# Patient Record
Sex: FEMALE | Race: WHITE
[De-identification: names, ages, dates, MRNs, and addresses within clinical notes are randomized per-mention and may not be internally consistent; named-entity substitution may affect disease eponyms.]

---

## 2018-07-16 NOTE — HP
[facility] MD Rehab Assess/Revision





- Admission History


Ad[facility] to Rehab from: [facility]


Date of Admission to Rehab: 07/16/18





- Vital signs


Vital Signs: 


 Vital Signs











 Period  Temp  Pulse  Resp  BP Sys/Storey  Pulse Ox


 


 Last 24 Hr  97.7 F  80  18  112/73  














- Findings


Detox History & Physical reviewed: Yes


Concur with findings: Yes


Comments/Additional Findings: for rehab as protocol





Inpatient Rehab Admission





- Initial Determination


Are CD services needed?: Yes


Free of communicable disease: Yes


Not in need of hospitalization: Yes





- Rehab Admission Criteria


Previous failed treatment: Yes


Poor recovery environment: Yes


Comorbidities: Yes


Lacks judgement: No


Patient is meeting Inpatient Rehab admission criteria:: Yes

## 2018-07-16 NOTE — PN
[facility] Progress Note


Note: 





 Vital Signs











Temperature  97.7 F   07/16/18 12:10


 


Pulse Rate  80   07/16/18 12:10


 


Respiratory Rate  18   07/16/18 12:10


 


Blood Pressure  112/73   07/16/18 12:10


 


O2 Sat by Pulse Oximetry (%)      








keppra levels 7/13/18 at 8.4, asymptomatic. 


Repeat levels in AM.
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
## 2018-07-20 NOTE — PN
[facility] Progress Note


Note: 





Repeat Keppra level 8.3. Patient asymptomatic. Will continue Keppra as ordered 

and repeat level 7/23/18.

## 2018-08-10 NOTE — PN
[facility] Progress Note


Note: 


Patient reported to RN that she had an aura yesterday. Has history of seizures 

and last seizure one year ago. Currently on Keppra and lab test for level 

pending. Patient is medically stable. Denies any symptoms today. Alert and 

oriented x 3. In NAD. Ambulating in unit freely. Will continue same dose of 

keppra. Continue to monitor clinically. 


 Vital Signs











Temperature  98.0 F   08/10/18 07:23


 


Pulse Rate  79   08/10/18 09:22


 


Respiratory Rate  18   08/10/18 07:23


 


Blood Pressure  119/80   08/10/18 09:22


 


O2 Sat by Pulse Oximetry (%)

## 2018-08-13 NOTE — PN
Psychiatric Progress Note


Vital Signs: 


 Vital Signs











 Period  Temp  Pulse  Resp  BP Sys/Storey  Pulse Ox


 


 Last 24 Hr  97.8 F  73-91  18-18  112-128/77-85  











Date of Session: 08/13/18


Chief Complaint:: Discharge visit


HPI: Opioid dependence ,Alcohol and Sedative dependence comorbid with PTSD,

Bipolar disorder.


ROS: Significant for


Current Medications: 


Active Medications











Generic Name Dose Route Start Last Admin





  Trade Name Freq  PRN Reason Stop Dose Admin


 


Acetaminophen  650 mg  07/16/18 13:12  07/26/18 18:53





  Tylenol -  PO   650 mg





  Q4H PRN   Administration





  FEVER   





     





     





     


 


Al Hydroxide/Mg Hydroxide  30 ml  07/16/18 13:12  





  Mylanta Oral Suspension -  PO   





  Q6H PRN   





  DYSPEPSIA   





     





     





     


 


Docusate Sodium  100 mg  08/06/18 22:00  08/13/18 06:30





  Colace -  PO   100 mg





  TID ALEKSANDRA   Administration





     





     





     





     


 


Escitalopram Oxalate  20 mg  07/16/18 17:00  08/12/18 10:04





  Lexapro -  PO   20 mg





  DAILY ALEKSANDRA   Administration





     





     





     





     


 


Eucalyptus/Menthol/Phenol/Sorbitol  1 each  07/16/18 13:12  





  Cepastat Lozenge -  MM   





  Q4H PRN   





  SORE THROAT   





     





     





     


 


Furosemide  20 mg  07/17/18 10:00  08/12/18 10:04





  Lasix -  PO   20 mg





  DAILY ALEKSANDRA   Administration





     





     





     





     


 


Guaifenesin  10 ml  07/16/18 13:12  





  Robitussin Dm -  PO   





  Q6H PRN   





  COUGH   





     





     





     


 


Hydroxyzine Pamoate  50 mg  07/16/18 13:12  08/12/18 21:29





  Vistaril -  PO   50 mg





  Q4H PRN   Administration





  AGITATION   





     





     





     


 


Ibuprofen  400 mg  07/16/18 13:12  08/04/18 21:54





  Motrin -  PO   400 mg





  Q6H PRN   Administration





  Pain Level 4-6   





     





     





     


 


Levetiracetam  500 mg  07/16/18 22:00  08/12/18 21:29





  Keppra -  PO   500 mg





  BID ALEKSANDRA   Administration





     





     





     





     


 


Loperamide HCl  4 mg  07/16/18 13:12  





  Imodium -  PO   





  Q6H PRN   





  DIARRHEA   





     





     





     


 


Magnesium Citrate  300 ml  07/16/18 13:12  





  Citroma -  PO   





  Q48H PRN   





  CONSTIPATION   





     





     





     


 


Magnesium Hydroxide  30 ml  07/16/18 13:12  





  Milk Of Magnesia -  PO   





  DAILY PRN   





  CONSTIPATION   





     





     





     


 


Melatonin  5 mg  07/16/18 22:00  07/21/18 22:10





  Melatonin  PO   5 mg





  HS PRN   Administration





  INSOMNIA   





     





     





     


 


Methadone HCl 80 mg/ Methadone  95 mg  08/13/18 06:00  08/13/18 06:31





  HCl 10 mg/ Methadone HCl 5 mg  PO  08/20/18 05:59  95 mg





  DAILY@0600 ALEKSANDRA   Administration





     





     





     





     


 


Prenatal Multivit/Folic Acid/Iron  1 tab  07/17/18 10:00  08/12/18 10:04





  Prenatal Vitamins (Sjr) -  PO   1 tab





  DAILY ALEKSANDRA   Administration





     





     





     





     


 


Pseudoephedrine/Triprolidine  1 combo  07/16/18 13:12  





  Actifed -  PO   





  TID PRN   





  NASAL CONGESTION   





     





     





     


 


Ranitidine HCl  150 mg  07/17/18 10:00  08/12/18 10:04





  Zantac -  PO   150 mg





  DAILY ALEKSANDRA   Administration





     





     





     





     


 


Thiamine HCl  100 mg  07/16/18 22:00  08/12/18 21:29





  Vitamin B1 -  PO   100 mg





  HS ALEKSANDRA   Administration





     





     





     





     











Current Side Effect: No


Lab tests ordered: No


Lab tests reviewed: Yes


Provider note:: Patient completed this program today .She has met her treatment 

goals and will continue to address her issues on outpatient basis.Patient 

continue to find that Lexapro 20 mg po daily help to cope with depressed mood ,

anxiety.Script for 30 days provided.  THrapy provided focusing on relapse 

prevention,coping skills,support utilization as well as other resourses of 

recovery maintanance has been provided.  Patient is stable for discharge today.


Total face to face time:: 25





Mental Status Exam





- Mental Status Exam


Alert and Oriented to: Time, Place, Person


Cognitive Function: Grossly Intact


Patient Appearance: Well Groomed


Mood: Hopeful, Euthymic


Affect: Appropriate, Mood Congruent


Patient Behavior: Cooperative


Voice Loudness: Normal


Thought Process: Goal Oriented


Thought Disorder: Not Present


Hallucinations: Denies


Suicidal Ideation: Denies


Homicidal Ideation: Denies


Insight/Judgement: Fair


Sleep: Fair


Appetite: Fair


Muscle strength/Tone: Normal


Gait/Station: Normal





Psychiatric Treatment Plan





- Problem List


(1) Sedative dependence


Current Visit: Yes   





(2) Nicotine dependence


Current Visit: Yes   


Qualifiers: 


   Nicotine product type: cigarettes 





(3) Opioid dependence on agonist therapy


Current Visit: Yes   





(4) Seizure disorder


Current Visit: Yes   





(5) Bipolar disorder


Current Visit: Yes   





(6) PTSD (post-traumatic stress disorder)


Current Visit: Yes   





(7) Alcohol dependence


Current Visit: No

## 2020-11-18 ENCOUNTER — HOSPITAL ENCOUNTER (INPATIENT)
Dept: HOSPITAL 74 - YASAS | Age: 35
LOS: 4 days | Discharge: LEFT BEFORE DISCHARGE | DRG: 770 | End: 2020-11-22
Attending: ALLERGY & IMMUNOLOGY | Admitting: ALLERGY & IMMUNOLOGY
Payer: COMMERCIAL

## 2020-11-18 VITALS — BODY MASS INDEX: 35.7 KG/M2

## 2020-11-18 DIAGNOSIS — F60.3: ICD-10-CM

## 2020-11-18 DIAGNOSIS — R73.03: ICD-10-CM

## 2020-11-18 DIAGNOSIS — R03.0: ICD-10-CM

## 2020-11-18 DIAGNOSIS — Z62.810: ICD-10-CM

## 2020-11-18 DIAGNOSIS — M54.5: ICD-10-CM

## 2020-11-18 DIAGNOSIS — G40.909: ICD-10-CM

## 2020-11-18 DIAGNOSIS — F31.9: ICD-10-CM

## 2020-11-18 DIAGNOSIS — D72.829: ICD-10-CM

## 2020-11-18 DIAGNOSIS — F17.210: ICD-10-CM

## 2020-11-18 DIAGNOSIS — K21.9: ICD-10-CM

## 2020-11-18 DIAGNOSIS — Z91.5: ICD-10-CM

## 2020-11-18 DIAGNOSIS — F90.9: ICD-10-CM

## 2020-11-18 DIAGNOSIS — F14.20: ICD-10-CM

## 2020-11-18 DIAGNOSIS — R35.0: ICD-10-CM

## 2020-11-18 DIAGNOSIS — F63.3: ICD-10-CM

## 2020-11-18 DIAGNOSIS — F10.230: Primary | ICD-10-CM

## 2020-11-18 DIAGNOSIS — F11.23: ICD-10-CM

## 2020-11-18 DIAGNOSIS — F43.10: ICD-10-CM

## 2020-11-18 DIAGNOSIS — Z91.410: ICD-10-CM

## 2020-11-18 DIAGNOSIS — F19.282: ICD-10-CM

## 2020-11-18 DIAGNOSIS — Z59.0: ICD-10-CM

## 2020-11-18 DIAGNOSIS — F19.24: ICD-10-CM

## 2020-11-18 DIAGNOSIS — B18.2: ICD-10-CM

## 2020-11-18 LAB
ALBUMIN SERPL-MCNC: 3.1 G/DL (ref 3.4–5)
ALP SERPL-CCNC: 110 U/L (ref 45–117)
ALT SERPL-CCNC: 19 U/L (ref 13–61)
ANION GAP SERPL CALC-SCNC: 5 MMOL/L (ref 8–16)
AST SERPL-CCNC: 12 U/L (ref 15–37)
BILIRUB SERPL-MCNC: 0.4 MG/DL (ref 0.2–1)
BUN SERPL-MCNC: 11.6 MG/DL (ref 7–18)
CALCIUM SERPL-MCNC: 8.9 MG/DL (ref 8.5–10.1)
CHLORIDE SERPL-SCNC: 105 MMOL/L (ref 98–107)
CO2 SERPL-SCNC: 30 MMOL/L (ref 21–32)
CREAT SERPL-MCNC: 1 MG/DL (ref 0.55–1.3)
DEPRECATED RDW RBC AUTO: 15.4 % (ref 11.6–15.6)
GLUCOSE SERPL-MCNC: 97 MG/DL (ref 74–106)
HCT VFR BLD CALC: 41.4 % (ref 32.4–45.2)
HGB BLD-MCNC: 13.6 GM/DL (ref 10.7–15.3)
MCH RBC QN AUTO: 28.4 PG (ref 25.7–33.7)
MCHC RBC AUTO-ENTMCNC: 33 G/DL (ref 32–36)
MCV RBC: 86.1 FL (ref 80–96)
PLATELET # BLD AUTO: 320 K/MM3 (ref 134–434)
PMV BLD: 7.6 FL (ref 7.5–11.1)
POTASSIUM SERPLBLD-SCNC: 3.9 MMOL/L (ref 3.5–5.1)
PROT SERPL-MCNC: 6.8 G/DL (ref 6.4–8.2)
RBC # BLD AUTO: 4.81 M/MM3 (ref 3.6–5.2)
SODIUM SERPL-SCNC: 140 MMOL/L (ref 136–145)
WBC # BLD AUTO: 11.5 K/MM3 (ref 4–10)

## 2020-11-18 PROCEDURE — U0003 INFECTIOUS AGENT DETECTION BY NUCLEIC ACID (DNA OR RNA); SEVERE ACUTE RESPIRATORY SYNDROME CORONAVIRUS 2 (SARS-COV-2) (CORONAVIRUS DISEASE [COVID-19]), AMPLIFIED PROBE TECHNIQUE, MAKING USE OF HIGH THROUGHPUT TECHNOLOGIES AS DESCRIBED BY CMS-2020-01-R: HCPCS

## 2020-11-18 PROCEDURE — C9803 HOPD COVID-19 SPEC COLLECT: HCPCS

## 2020-11-18 PROCEDURE — HZ2ZZZZ DETOXIFICATION SERVICES FOR SUBSTANCE ABUSE TREATMENT: ICD-10-PCS | Performed by: ALLERGY & IMMUNOLOGY

## 2020-11-18 RX ADMIN — HYDROXYZINE PAMOATE PRN MG: 25 CAPSULE ORAL at 19:20

## 2020-11-18 RX ADMIN — NICOTINE SCH: 14 PATCH, EXTENDED RELEASE TRANSDERMAL at 14:35

## 2020-11-18 RX ADMIN — Medication SCH MG: at 22:06

## 2020-11-18 RX ADMIN — HYDROXYZINE PAMOATE PRN MG: 25 CAPSULE ORAL at 14:38

## 2020-11-18 RX ADMIN — Medication SCH TAB: at 14:35

## 2020-11-18 RX ADMIN — IBUPROFEN PRN MG: 400 TABLET, FILM COATED ORAL at 19:20

## 2020-11-18 RX ADMIN — METHOCARBAMOL PRN MG: 500 TABLET ORAL at 18:11

## 2020-11-18 SDOH — ECONOMIC STABILITY - HOUSING INSECURITY: HOMELESSNESS: Z59.0

## 2020-11-19 RX ADMIN — BUPRENORPHINE HYDROCHLORIDE, NALOXONE HYDROCHLORIDE SCH EACH: 8; 2 FILM, SOLUBLE BUCCAL; SUBLINGUAL at 09:09

## 2020-11-19 RX ADMIN — HYDROXYZINE PAMOATE PRN MG: 25 CAPSULE ORAL at 09:09

## 2020-11-19 RX ADMIN — HYDROXYZINE PAMOATE PRN MG: 50 CAPSULE ORAL at 22:37

## 2020-11-19 RX ADMIN — ESCITALOPRAM SCH MG: 20 TABLET, FILM COATED ORAL at 11:58

## 2020-11-19 RX ADMIN — MIRTAZAPINE SCH MG: 15 TABLET, FILM COATED ORAL at 22:34

## 2020-11-19 RX ADMIN — NICOTINE SCH MG: 14 PATCH, EXTENDED RELEASE TRANSDERMAL at 10:22

## 2020-11-19 RX ADMIN — HYDROXYZINE PAMOATE PRN MG: 50 CAPSULE ORAL at 15:58

## 2020-11-19 RX ADMIN — HYDROXYZINE PAMOATE PRN MG: 50 CAPSULE ORAL at 11:59

## 2020-11-19 RX ADMIN — METHOCARBAMOL PRN MG: 500 TABLET ORAL at 15:55

## 2020-11-19 RX ADMIN — IBUPROFEN PRN MG: 400 TABLET, FILM COATED ORAL at 17:52

## 2020-11-19 RX ADMIN — Medication SCH TAB: at 10:21

## 2020-11-19 RX ADMIN — SUVOREXANT PRN MG: 10 TABLET, FILM COATED ORAL at 22:33

## 2020-11-19 RX ADMIN — PANTOPRAZOLE SODIUM SCH MG: 40 TABLET, DELAYED RELEASE ORAL at 10:30

## 2020-11-19 RX ADMIN — Medication SCH MG: at 22:34

## 2020-11-20 RX ADMIN — HYDROXYZINE PAMOATE PRN MG: 50 CAPSULE ORAL at 10:36

## 2020-11-20 RX ADMIN — HYDROXYZINE PAMOATE PRN MG: 50 CAPSULE ORAL at 13:55

## 2020-11-20 RX ADMIN — DOCUSATE SODIUM SCH MG: 100 CAPSULE, LIQUID FILLED ORAL at 22:08

## 2020-11-20 RX ADMIN — Medication SCH TAB: at 10:31

## 2020-11-20 RX ADMIN — LIDOCAINE SCH PATCH: 50 PATCH TOPICAL at 13:48

## 2020-11-20 RX ADMIN — HYDROXYZINE PAMOATE PRN MG: 50 CAPSULE ORAL at 22:10

## 2020-11-20 RX ADMIN — HYDROXYZINE PAMOATE PRN MG: 50 CAPSULE ORAL at 05:56

## 2020-11-20 RX ADMIN — NICOTINE SCH: 14 PATCH, EXTENDED RELEASE TRANSDERMAL at 10:31

## 2020-11-20 RX ADMIN — PANTOPRAZOLE SODIUM SCH MG: 40 TABLET, DELAYED RELEASE ORAL at 10:30

## 2020-11-20 RX ADMIN — IBUPROFEN PRN MG: 400 TABLET, FILM COATED ORAL at 17:28

## 2020-11-20 RX ADMIN — HYDROXYZINE PAMOATE PRN MG: 50 CAPSULE ORAL at 17:26

## 2020-11-20 RX ADMIN — ESCITALOPRAM SCH MG: 20 TABLET, FILM COATED ORAL at 10:30

## 2020-11-20 RX ADMIN — METHOCARBAMOL PRN MG: 500 TABLET ORAL at 05:55

## 2020-11-20 RX ADMIN — METHOCARBAMOL PRN MG: 500 TABLET ORAL at 17:26

## 2020-11-20 RX ADMIN — Medication SCH EACH: at 22:12

## 2020-11-20 RX ADMIN — HYDROCORTISONE SCH APPLIC: 1 CREAM TOPICAL at 13:53

## 2020-11-20 RX ADMIN — SUVOREXANT PRN MG: 10 TABLET, FILM COATED ORAL at 22:09

## 2020-11-20 RX ADMIN — MIRTAZAPINE SCH MG: 15 TABLET, FILM COATED ORAL at 22:08

## 2020-11-20 RX ADMIN — DOCUSATE SODIUM SCH: 100 CAPSULE, LIQUID FILLED ORAL at 15:12

## 2020-11-20 RX ADMIN — Medication SCH MG: at 22:08

## 2020-11-20 RX ADMIN — BUPRENORPHINE HYDROCHLORIDE, NALOXONE HYDROCHLORIDE SCH EACH: 8; 2 FILM, SOLUBLE BUCCAL; SUBLINGUAL at 10:31

## 2020-11-21 LAB — HIV 1+2 AB+HIV1 P24 AG SERPL QL IA: NEGATIVE

## 2020-11-21 RX ADMIN — HYDROCORTISONE SCH APPLIC: 1 CREAM TOPICAL at 10:05

## 2020-11-21 RX ADMIN — HYDROXYZINE PAMOATE PRN MG: 50 CAPSULE ORAL at 07:36

## 2020-11-21 RX ADMIN — HYDROXYZINE PAMOATE PRN MG: 50 CAPSULE ORAL at 03:27

## 2020-11-21 RX ADMIN — DOCUSATE SODIUM SCH MG: 100 CAPSULE, LIQUID FILLED ORAL at 14:08

## 2020-11-21 RX ADMIN — METHOCARBAMOL PRN MG: 500 TABLET ORAL at 06:02

## 2020-11-21 RX ADMIN — LIDOCAINE SCH PATCH: 50 PATCH TOPICAL at 10:10

## 2020-11-21 RX ADMIN — DOCUSATE SODIUM SCH MG: 100 CAPSULE, LIQUID FILLED ORAL at 05:58

## 2020-11-21 RX ADMIN — ESCITALOPRAM SCH MG: 20 TABLET, FILM COATED ORAL at 10:06

## 2020-11-21 RX ADMIN — BUPRENORPHINE HYDROCHLORIDE, NALOXONE HYDROCHLORIDE SCH EACH: 8; 2 FILM, SOLUBLE BUCCAL; SUBLINGUAL at 10:06

## 2020-11-21 RX ADMIN — Medication SCH EACH: at 22:07

## 2020-11-21 RX ADMIN — NICOTINE SCH MG: 14 PATCH, EXTENDED RELEASE TRANSDERMAL at 10:10

## 2020-11-21 RX ADMIN — DOCUSATE SODIUM SCH MG: 100 CAPSULE, LIQUID FILLED ORAL at 22:06

## 2020-11-21 RX ADMIN — HYDROXYZINE PAMOATE PRN MG: 50 CAPSULE ORAL at 13:57

## 2020-11-21 RX ADMIN — METHOCARBAMOL PRN MG: 500 TABLET ORAL at 15:50

## 2020-11-21 RX ADMIN — SUVOREXANT PRN MG: 10 TABLET, FILM COATED ORAL at 22:06

## 2020-11-21 RX ADMIN — MIRTAZAPINE SCH MG: 15 TABLET, FILM COATED ORAL at 22:06

## 2020-11-21 RX ADMIN — HYDROXYZINE PAMOATE PRN MG: 50 CAPSULE ORAL at 22:09

## 2020-11-21 RX ADMIN — Medication SCH MG: at 22:06

## 2020-11-21 RX ADMIN — Medication SCH TAB: at 10:11

## 2020-11-21 RX ADMIN — PANTOPRAZOLE SODIUM SCH MG: 40 TABLET, DELAYED RELEASE ORAL at 10:06

## 2020-11-22 VITALS — TEMPERATURE: 98 F | SYSTOLIC BLOOD PRESSURE: 146 MMHG | HEART RATE: 94 BPM | DIASTOLIC BLOOD PRESSURE: 88 MMHG

## 2020-11-22 RX ADMIN — Medication SCH TAB: at 10:30

## 2020-11-22 RX ADMIN — DOCUSATE SODIUM SCH: 100 CAPSULE, LIQUID FILLED ORAL at 14:15

## 2020-11-22 RX ADMIN — HYDROXYZINE PAMOATE PRN MG: 50 CAPSULE ORAL at 10:30

## 2020-11-22 RX ADMIN — DOCUSATE SODIUM SCH MG: 100 CAPSULE, LIQUID FILLED ORAL at 07:18

## 2020-11-22 RX ADMIN — HYDROXYZINE PAMOATE PRN MG: 50 CAPSULE ORAL at 14:14

## 2020-11-22 RX ADMIN — ESCITALOPRAM SCH MG: 20 TABLET, FILM COATED ORAL at 10:29

## 2020-11-22 RX ADMIN — NICOTINE SCH MG: 14 PATCH, EXTENDED RELEASE TRANSDERMAL at 10:30

## 2020-11-22 RX ADMIN — LIDOCAINE SCH PATCH: 50 PATCH TOPICAL at 10:30

## 2020-11-22 RX ADMIN — METHOCARBAMOL PRN MG: 500 TABLET ORAL at 07:25

## 2020-11-22 RX ADMIN — HYDROCORTISONE SCH APPLIC: 1 CREAM TOPICAL at 10:31

## 2020-11-22 RX ADMIN — PANTOPRAZOLE SODIUM SCH MG: 40 TABLET, DELAYED RELEASE ORAL at 10:31

## 2020-11-22 RX ADMIN — BUPRENORPHINE HYDROCHLORIDE, NALOXONE HYDROCHLORIDE SCH EACH: 8; 2 FILM, SOLUBLE BUCCAL; SUBLINGUAL at 10:30

## 2020-11-22 RX ADMIN — HYDROXYZINE PAMOATE PRN MG: 50 CAPSULE ORAL at 07:22

## 2021-03-30 ENCOUNTER — HOSPITAL ENCOUNTER (INPATIENT)
Dept: HOSPITAL 74 - YASAS | Age: 36
LOS: 2 days | Discharge: LEFT BEFORE DISCHARGE | DRG: 770 | End: 2021-04-01
Attending: ALLERGY & IMMUNOLOGY | Admitting: ALLERGY & IMMUNOLOGY
Payer: COMMERCIAL

## 2021-03-30 VITALS — BODY MASS INDEX: 29.9 KG/M2

## 2021-03-30 DIAGNOSIS — F11.23: Primary | ICD-10-CM

## 2021-03-30 DIAGNOSIS — F63.3: ICD-10-CM

## 2021-03-30 DIAGNOSIS — G40.909: ICD-10-CM

## 2021-03-30 DIAGNOSIS — N89.8: ICD-10-CM

## 2021-03-30 DIAGNOSIS — Z86.19: ICD-10-CM

## 2021-03-30 DIAGNOSIS — Z91.410: ICD-10-CM

## 2021-03-30 DIAGNOSIS — Z56.0: ICD-10-CM

## 2021-03-30 DIAGNOSIS — Z91.19: ICD-10-CM

## 2021-03-30 DIAGNOSIS — E11.9: ICD-10-CM

## 2021-03-30 DIAGNOSIS — F19.20: ICD-10-CM

## 2021-03-30 DIAGNOSIS — K21.9: ICD-10-CM

## 2021-03-30 DIAGNOSIS — F31.9: ICD-10-CM

## 2021-03-30 DIAGNOSIS — F19.24: ICD-10-CM

## 2021-03-30 DIAGNOSIS — Z59.0: ICD-10-CM

## 2021-03-30 DIAGNOSIS — F17.210: ICD-10-CM

## 2021-03-30 DIAGNOSIS — F14.20: ICD-10-CM

## 2021-03-30 LAB
ALBUMIN SERPL-MCNC: 3.2 G/DL (ref 3.4–5)
ALP SERPL-CCNC: 92 U/L (ref 45–117)
ALT SERPL-CCNC: 15 U/L (ref 13–61)
ANION GAP SERPL CALC-SCNC: 10 MMOL/L (ref 8–16)
APPEARANCE UR: CLEAR
AST SERPL-CCNC: 8 U/L (ref 15–37)
BACTERIA # UR AUTO: 322 /UL (ref 0–1359)
BILIRUB SERPL-MCNC: 0.2 MG/DL (ref 0.2–1)
BILIRUB UR STRIP.AUTO-MCNC: NEGATIVE MG/DL
BUN SERPL-MCNC: 10 MG/DL (ref 7–18)
CALCIUM SERPL-MCNC: 8.9 MG/DL (ref 8.5–10.1)
CASTS URNS QL MICRO: 6 /UL (ref 0–3.1)
CHLORIDE SERPL-SCNC: 105 MMOL/L (ref 98–107)
CO2 SERPL-SCNC: 27 MMOL/L (ref 21–32)
COLOR UR: YELLOW
CREAT SERPL-MCNC: 0.8 MG/DL (ref 0.55–1.3)
DEPRECATED RDW RBC AUTO: 13.3 % (ref 11.6–15.6)
EPITH CASTS URNS QL MICRO: 10 /UL (ref 0–25.1)
GLUCOSE SERPL-MCNC: 148 MG/DL (ref 74–106)
HCT VFR BLD CALC: 38 % (ref 32.4–45.2)
HGB BLD-MCNC: 12.6 GM/DL (ref 10.7–15.3)
KETONES UR QL STRIP: NEGATIVE
LEUKOCYTE ESTERASE UR QL STRIP.AUTO: (no result)
MCH RBC QN AUTO: 28.7 PG (ref 25.7–33.7)
MCHC RBC AUTO-ENTMCNC: 33.1 G/DL (ref 32–36)
MCV RBC: 87 FL (ref 80–96)
NITRITE UR QL STRIP: NEGATIVE
PH UR: 6.5 [PH] (ref 5–8)
PLATELET # BLD AUTO: 354 K/MM3 (ref 134–434)
PMV BLD: 8 FL (ref 7.5–11.1)
POTASSIUM SERPLBLD-SCNC: 3.4 MMOL/L (ref 3.5–5.1)
PROT SERPL-MCNC: 6.8 G/DL (ref 6.4–8.2)
PROT UR QL STRIP: NEGATIVE
PROT UR QL STRIP: NEGATIVE
RBC # BLD AUTO: 12 /UL (ref 0–23.9)
RBC # BLD AUTO: 4.37 M/MM3 (ref 3.6–5.2)
SODIUM SERPL-SCNC: 141 MMOL/L (ref 136–145)
SP GR UR: 1.03 (ref 1.01–1.03)
UROBILINOGEN UR STRIP-MCNC: 1 MG/DL (ref 0.2–1)
WBC # BLD AUTO: 13.7 K/MM3 (ref 4–10)
WBC # UR AUTO: 115 /UL (ref 0–25.8)

## 2021-03-30 PROCEDURE — U0003 INFECTIOUS AGENT DETECTION BY NUCLEIC ACID (DNA OR RNA); SEVERE ACUTE RESPIRATORY SYNDROME CORONAVIRUS 2 (SARS-COV-2) (CORONAVIRUS DISEASE [COVID-19]), AMPLIFIED PROBE TECHNIQUE, MAKING USE OF HIGH THROUGHPUT TECHNOLOGIES AS DESCRIBED BY CMS-2020-01-R: HCPCS

## 2021-03-30 PROCEDURE — U0005 INFEC AGEN DETEC AMPLI PROBE: HCPCS

## 2021-03-30 PROCEDURE — HZ2ZZZZ DETOXIFICATION SERVICES FOR SUBSTANCE ABUSE TREATMENT: ICD-10-PCS | Performed by: ALLERGY & IMMUNOLOGY

## 2021-03-30 PROCEDURE — C9803 HOPD COVID-19 SPEC COLLECT: HCPCS

## 2021-03-30 RX ADMIN — SUVOREXANT PRN MG: 10 TABLET, FILM COATED ORAL at 22:12

## 2021-03-30 RX ADMIN — METHOCARBAMOL PRN MG: 500 TABLET ORAL at 19:45

## 2021-03-30 RX ADMIN — NICOTINE SCH: 21 PATCH TRANSDERMAL at 12:10

## 2021-03-30 RX ADMIN — HYDROXYZINE PAMOATE SCH MG: 25 CAPSULE ORAL at 22:12

## 2021-03-30 RX ADMIN — Medication SCH: at 22:13

## 2021-03-30 RX ADMIN — HYDROXYZINE PAMOATE SCH: 25 CAPSULE ORAL at 14:28

## 2021-03-30 RX ADMIN — HYDROXYZINE PAMOATE SCH MG: 25 CAPSULE ORAL at 17:19

## 2021-03-30 RX ADMIN — Medication SCH MG: at 22:12

## 2021-03-30 SDOH — ECONOMIC STABILITY - INCOME SECURITY: UNEMPLOYMENT, UNSPECIFIED: Z56.0

## 2021-03-30 SDOH — ECONOMIC STABILITY - HOUSING INSECURITY: HOMELESSNESS: Z59.0

## 2021-03-31 RX ADMIN — NICOTINE SCH: 21 PATCH TRANSDERMAL at 09:29

## 2021-03-31 RX ADMIN — METHOCARBAMOL PRN MG: 500 TABLET ORAL at 09:30

## 2021-03-31 RX ADMIN — Medication SCH TAB: at 09:31

## 2021-03-31 RX ADMIN — HYDROXYZINE PAMOATE SCH MG: 25 CAPSULE ORAL at 09:30

## 2021-03-31 RX ADMIN — Medication SCH MG: at 21:59

## 2021-03-31 RX ADMIN — SUVOREXANT PRN MG: 10 TABLET, FILM COATED ORAL at 21:59

## 2021-03-31 RX ADMIN — HYDROXYZINE PAMOATE SCH: 25 CAPSULE ORAL at 06:14

## 2021-03-31 RX ADMIN — HYDROXYZINE PAMOATE SCH MG: 25 CAPSULE ORAL at 17:10

## 2021-03-31 RX ADMIN — HYDROXYZINE PAMOATE SCH MG: 25 CAPSULE ORAL at 21:59

## 2021-03-31 RX ADMIN — POTASSIUM CHLORIDE SCH MEQ: 20 SOLUTION ORAL at 21:59

## 2021-03-31 RX ADMIN — HYDROXYZINE PAMOATE SCH MG: 25 CAPSULE ORAL at 13:13

## 2021-04-01 VITALS — SYSTOLIC BLOOD PRESSURE: 123 MMHG | TEMPERATURE: 96.9 F | HEART RATE: 84 BPM | DIASTOLIC BLOOD PRESSURE: 85 MMHG

## 2021-04-01 LAB
BASOPHILS # BLD: 0.6 % (ref 0–2)
DEPRECATED RDW RBC AUTO: 13.1 % (ref 11.6–15.6)
EOSINOPHIL # BLD: 1.6 % (ref 0–4.5)
HCT VFR BLD CALC: 37.9 % (ref 32.4–45.2)
HGB BLD-MCNC: 12.7 GM/DL (ref 10.7–15.3)
LYMPHOCYTES # BLD: 31.2 % (ref 8–40)
MCH RBC QN AUTO: 29 PG (ref 25.7–33.7)
MCHC RBC AUTO-ENTMCNC: 33.5 G/DL (ref 32–36)
MCV RBC: 86.5 FL (ref 80–96)
MONOCYTES # BLD AUTO: 9.1 % (ref 3.8–10.2)
NEUTROPHILS # BLD: 57.5 % (ref 42.8–82.8)
PLATELET # BLD AUTO: 327 K/MM3 (ref 134–434)
PMV BLD: 8.2 FL (ref 7.5–11.1)
RBC # BLD AUTO: 4.38 M/MM3 (ref 3.6–5.2)
WBC # BLD AUTO: 11.2 K/MM3 (ref 4–10)

## 2021-04-01 RX ADMIN — NICOTINE SCH: 21 PATCH TRANSDERMAL at 09:13

## 2021-04-01 RX ADMIN — METHOCARBAMOL PRN MG: 500 TABLET ORAL at 17:12

## 2021-04-01 RX ADMIN — HYDROXYZINE PAMOATE SCH MG: 25 CAPSULE ORAL at 17:13

## 2021-04-01 RX ADMIN — HYDROXYZINE PAMOATE SCH: 25 CAPSULE ORAL at 13:33

## 2021-04-01 RX ADMIN — HYDROXYZINE PAMOATE SCH MG: 25 CAPSULE ORAL at 05:38

## 2021-04-01 RX ADMIN — HYDROXYZINE PAMOATE SCH MG: 25 CAPSULE ORAL at 09:12

## 2021-04-01 RX ADMIN — POTASSIUM CHLORIDE SCH MEQ: 20 SOLUTION ORAL at 09:12

## 2021-04-01 RX ADMIN — Medication SCH TAB: at 09:12

## 2023-06-15 ENCOUNTER — HOSPITAL ENCOUNTER (INPATIENT)
Dept: HOSPITAL 74 - YASAS | Age: 38
LOS: 4 days | Discharge: LEFT BEFORE DISCHARGE | DRG: 770 | End: 2023-06-19
Attending: PSYCHIATRY & NEUROLOGY | Admitting: ALLERGY & IMMUNOLOGY
Payer: COMMERCIAL

## 2023-06-15 VITALS — BODY MASS INDEX: 19.1 KG/M2

## 2023-06-15 DIAGNOSIS — F31.9: ICD-10-CM

## 2023-06-15 DIAGNOSIS — F11.20: ICD-10-CM

## 2023-06-15 DIAGNOSIS — F14.20: Primary | ICD-10-CM

## 2023-06-15 DIAGNOSIS — F12.20: ICD-10-CM

## 2023-06-15 DIAGNOSIS — F43.10: ICD-10-CM

## 2023-06-15 DIAGNOSIS — Z86.19: ICD-10-CM

## 2023-06-15 DIAGNOSIS — F41.9: ICD-10-CM

## 2023-06-15 DIAGNOSIS — K21.9: ICD-10-CM

## 2023-06-15 DIAGNOSIS — F17.210: ICD-10-CM

## 2023-06-15 PROCEDURE — HZ42ZZZ GROUP COUNSELING FOR SUBSTANCE ABUSE TREATMENT, COGNITIVE-BEHAVIORAL: ICD-10-PCS | Performed by: PSYCHIATRY & NEUROLOGY

## 2023-06-15 RX ADMIN — SULFAMETHOXAZOLE AND TRIMETHOPRIM SCH EACH: 800; 160 TABLET ORAL at 22:37

## 2023-06-15 RX ADMIN — Medication SCH MG: at 22:38

## 2023-06-16 LAB
ALBUMIN SERPL-MCNC: 3 G/DL (ref 3.4–5)
ALP SERPL-CCNC: 67 U/L (ref 45–117)
ALT SERPL-CCNC: 14 U/L (ref 13–61)
ANION GAP SERPL CALC-SCNC: 9 MMOL/L (ref 8–16)
AST SERPL-CCNC: 14 U/L (ref 15–37)
BILIRUB SERPL-MCNC: 0.2 MG/DL (ref 0.2–1)
BUN SERPL-MCNC: 9.5 MG/DL (ref 7–18)
CALCIUM SERPL-MCNC: 8.7 MG/DL (ref 8.5–10.1)
CHLORIDE SERPL-SCNC: 105 MMOL/L (ref 98–107)
CO2 SERPL-SCNC: 27 MMOL/L (ref 21–32)
CREAT SERPL-MCNC: 0.6 MG/DL (ref 0.55–1.3)
DEPRECATED RDW RBC AUTO: 15.9 % (ref 11.6–15.6)
GLUCOSE SERPL-MCNC: 104 MG/DL (ref 74–106)
HCT VFR BLD CALC: 39 % (ref 32.4–45.2)
HGB BLD-MCNC: 12.9 GM/DL (ref 10.7–15.3)
HIV 1+2 AB+HIV1 P24 AG SERPL QL IA: NEGATIVE
MCH RBC QN AUTO: 26.7 PG (ref 25.7–33.7)
MCHC RBC AUTO-ENTMCNC: 33.2 G/DL (ref 32–36)
MCV RBC: 80.4 FL (ref 80–96)
PLATELET # BLD AUTO: 300 10^3/UL (ref 134–434)
PMV BLD: 7.3 FL (ref 7.5–11.1)
POTASSIUM SERPLBLD-SCNC: 4.3 MMOL/L (ref 3.5–5.1)
PROT SERPL-MCNC: 6.6 G/DL (ref 6.4–8.2)
RBC # BLD AUTO: 4.85 M/MM3 (ref 3.6–5.2)
SODIUM SERPL-SCNC: 141 MMOL/L (ref 136–145)
TREPONEMA PALLIDUM AB [UNITS/VOLUME] IN SERUM OR PLASMA BY IMMUNOASSAY: (no result)
WBC # BLD AUTO: 7.7 K/MM3 (ref 4–10)

## 2023-06-16 RX ADMIN — HYDROXYZINE PAMOATE PRN MG: 25 CAPSULE ORAL at 15:00

## 2023-06-16 RX ADMIN — Medication SCH TAB: at 10:15

## 2023-06-16 RX ADMIN — SULFAMETHOXAZOLE AND TRIMETHOPRIM SCH EACH: 800; 160 TABLET ORAL at 21:34

## 2023-06-16 RX ADMIN — Medication SCH MG: at 21:34

## 2023-06-16 RX ADMIN — SUVOREXANT PRN MG: 10 TABLET, FILM COATED ORAL at 21:37

## 2023-06-16 RX ADMIN — HYDROXYZINE PAMOATE PRN MG: 25 CAPSULE ORAL at 21:35

## 2023-06-16 RX ADMIN — LOPERAMIDE HYDROCHLORIDE PRN MG: 2 CAPSULE ORAL at 11:36

## 2023-06-16 RX ADMIN — SULFAMETHOXAZOLE AND TRIMETHOPRIM SCH EACH: 800; 160 TABLET ORAL at 10:15

## 2023-06-17 RX ADMIN — Medication SCH MG: at 21:11

## 2023-06-17 RX ADMIN — SULFAMETHOXAZOLE AND TRIMETHOPRIM SCH EACH: 800; 160 TABLET ORAL at 21:11

## 2023-06-17 RX ADMIN — LOPERAMIDE HYDROCHLORIDE PRN MG: 2 CAPSULE ORAL at 20:30

## 2023-06-17 RX ADMIN — HYDROXYZINE PAMOATE PRN MG: 25 CAPSULE ORAL at 17:02

## 2023-06-17 RX ADMIN — Medication SCH TAB: at 09:59

## 2023-06-17 RX ADMIN — SULFAMETHOXAZOLE AND TRIMETHOPRIM SCH EACH: 800; 160 TABLET ORAL at 10:00

## 2023-06-17 RX ADMIN — SUVOREXANT PRN MG: 10 TABLET, FILM COATED ORAL at 21:11

## 2023-06-18 VITALS — RESPIRATION RATE: 18 BRPM

## 2023-06-18 RX ADMIN — SULFAMETHOXAZOLE AND TRIMETHOPRIM SCH EACH: 800; 160 TABLET ORAL at 21:31

## 2023-06-18 RX ADMIN — SULFAMETHOXAZOLE AND TRIMETHOPRIM SCH EACH: 800; 160 TABLET ORAL at 09:52

## 2023-06-18 RX ADMIN — NICOTINE POLACRILEX PRN MG: 2 GUM, CHEWING ORAL at 16:20

## 2023-06-18 RX ADMIN — Medication SCH TAB: at 09:52

## 2023-06-18 RX ADMIN — Medication SCH MG: at 21:31

## 2023-06-18 RX ADMIN — SUVOREXANT PRN MG: 10 TABLET, FILM COATED ORAL at 21:30

## 2023-06-18 RX ADMIN — NICOTINE POLACRILEX PRN MG: 2 GUM, CHEWING ORAL at 20:33

## 2023-06-18 RX ADMIN — HYDROXYZINE PAMOATE PRN MG: 25 CAPSULE ORAL at 09:53

## 2023-06-18 RX ADMIN — HYDROXYZINE PAMOATE PRN MG: 25 CAPSULE ORAL at 16:22

## 2023-06-19 VITALS — DIASTOLIC BLOOD PRESSURE: 76 MMHG | HEART RATE: 82 BPM | SYSTOLIC BLOOD PRESSURE: 116 MMHG

## 2023-06-19 VITALS — TEMPERATURE: 97.6 F

## 2023-06-19 LAB
APPEARANCE UR: CLEAR
BACTERIA # UR AUTO: 80 /UL (ref 0–1359)
BILIRUB UR STRIP.AUTO-MCNC: NEGATIVE MG/DL
CASTS URNS QL MICRO: 4 /UL (ref 0–3.1)
COLOR UR: YELLOW
EPITH CASTS URNS QL MICRO: >36 /UL (ref 0–25.1)
KETONES UR QL STRIP: NEGATIVE
LEUKOCYTE ESTERASE UR QL STRIP.AUTO: (no result)
NITRITE UR QL STRIP: NEGATIVE
PH UR: 7 [PH] (ref 5–8)
PROT UR QL STRIP: NEGATIVE
PROT UR QL STRIP: NEGATIVE
RBC # BLD AUTO: 10 /UL (ref 0–23.9)
SP GR UR: 1.02 (ref 1.01–1.03)
UROBILINOGEN UR STRIP-MCNC: 0.2 MG/DL (ref 0.2–1)
WBC # UR AUTO: 179 /UL (ref 0–25.8)

## 2023-06-19 RX ADMIN — Medication SCH TAB: at 10:04

## 2023-06-19 RX ADMIN — NICOTINE POLACRILEX PRN MG: 2 GUM, CHEWING ORAL at 15:35

## 2023-06-19 RX ADMIN — SULFAMETHOXAZOLE AND TRIMETHOPRIM SCH EACH: 800; 160 TABLET ORAL at 10:04

## 2023-06-19 RX ADMIN — HYDROXYZINE PAMOATE PRN MG: 25 CAPSULE ORAL at 10:04

## 2023-06-19 RX ADMIN — NICOTINE POLACRILEX PRN MG: 2 GUM, CHEWING ORAL at 10:05
